# Patient Record
Sex: MALE | Race: ASIAN | URBAN - METROPOLITAN AREA
[De-identification: names, ages, dates, MRNs, and addresses within clinical notes are randomized per-mention and may not be internally consistent; named-entity substitution may affect disease eponyms.]

---

## 2017-05-15 ENCOUNTER — OFFICE VISIT (OUTPATIENT)
Dept: URGENT CARE | Facility: URGENT CARE | Age: 22
End: 2017-05-15
Payer: COMMERCIAL

## 2017-05-15 ENCOUNTER — RADIANT APPOINTMENT (OUTPATIENT)
Dept: GENERAL RADIOLOGY | Facility: CLINIC | Age: 22
End: 2017-05-15
Attending: FAMILY MEDICINE
Payer: COMMERCIAL

## 2017-05-15 VITALS
DIASTOLIC BLOOD PRESSURE: 70 MMHG | TEMPERATURE: 98.8 F | HEIGHT: 69 IN | WEIGHT: 200 LBS | HEART RATE: 104 BPM | SYSTOLIC BLOOD PRESSURE: 110 MMHG | BODY MASS INDEX: 29.62 KG/M2 | OXYGEN SATURATION: 97 %

## 2017-05-15 DIAGNOSIS — S93.401A SPRAIN OF RIGHT ANKLE, UNSPECIFIED LIGAMENT, INITIAL ENCOUNTER: Primary | ICD-10-CM

## 2017-05-15 DIAGNOSIS — S93.401A SPRAIN OF RIGHT ANKLE, UNSPECIFIED LIGAMENT, INITIAL ENCOUNTER: ICD-10-CM

## 2017-05-15 PROCEDURE — 99203 OFFICE O/P NEW LOW 30 MIN: CPT | Performed by: FAMILY MEDICINE

## 2017-05-15 PROCEDURE — 73610 X-RAY EXAM OF ANKLE: CPT | Mod: RT

## 2017-05-15 RX ORDER — HYDROCODONE BITARTRATE AND ACETAMINOPHEN 5; 325 MG/1; MG/1
1-2 TABLET ORAL EVERY 4 HOURS PRN
Qty: 15 TABLET | Refills: 0 | Status: SHIPPED | OUTPATIENT
Start: 2017-05-15

## 2017-05-15 NOTE — PATIENT INSTRUCTIONS
Treating Ankle Sprains  Treatment will depend on how bad your sprain is. For a severe sprain, healing may take 3 months or more.  Right after your injury: Use R.I.C.E.    Rest: At first, keep weight off the ankle as much as you can. You may be given crutches to help you walk without putting weight on the ankle.    Ice: Put an ice pack on the ankle for 15 minutes. Remove the pack and wait at least 30 minutes. Repeat for up to 3 days. This helps reduce swelling.    Compression: To reduce swelling and keep the joint stable, you may need to wrap the ankle with an elastic bandage. For more severe sprains, you may need an ankle brace or a cast.    Elevation: To reduce swelling, keep your ankle raised above your heart when you sit or lie down.  Medicine  Your healthcare provider may suggest oral non-steroidal anti-inflammatory medicine (NSAIDs), such as ibuprofen. This relieves the pain and helps reduce any swelling. Be sure to take your medicine as directed.  Contrast baths  After 3 days, soak your ankle in warm water for 30 seconds, then in cool water for 30 seconds. Go back and forth for 5 minutes. Doing this every 2 hours will help keep the swelling down.  Exercises    After about 2 to 3 weeks, you may be given exercises to strengthen the ligaments and muscles in the ankle. Doing these exercises will help prevent another ankle sprain. Exercises may include standing on your toes and then on your heels and doing ankle curls.  Ankle curls    Sit on the edge of a sturdy table or lie on your back.    Pull your toes toward you. Then point them away from you. Repeat for 2 to 3 minutes.     1066-2956 [employer]. [address] [place] [address]. All rights reserved. This information is not intended as a substitute for professional medical care. Always follow your healthcare professional's instructions.        What Are Ankle Sprains?  The ankle is one of the most common places in the body for a sprain.  Landing wrong on your foot can cause the ankle to roll to the side. This can stretch or tear ligaments. Ankle sprains can occur at any time, such as when you step off a curb or play sports. Once you ve had an ankle sprain, you may be more likely to sprain that ankle again.    When ligaments tear  Your ankle joint is where the bones in your leg and foot meet. Strong bands of tissue called ligaments connect these bones. Tendons cross the ankle and connect muscles in the lower leg to the foot. The ligaments and tendons help keep the ankle joint stable when you move. If you twist or turn your ankle, the ligaments can stretch or tear. This is called a sprain. A sprain can be mild, moderate, or severe. This depends on how badly the ligaments are damaged.    Symptoms  Your symptoms depend on how badly the ligaments are damaged. You may have little pain and swelling if the ligaments are only stretched. If the ligaments tear, you will have more pain and swelling. The more severe the sprain, the less you ll be able to move the ankle or put weight on it. The ankle may also turn black-and-blue, and the bruising may extend into the foot and leg.     9753-1805 [employer]. [address] [place] [address], [place] [address]. All rights reserved. This information is not intended as a substitute for professional medical care. Always follow your healthcare professional's instructions.

## 2017-05-15 NOTE — NURSING NOTE
"Jame Toledo is a 22 year old male.      Chief Complaint   Patient presents with     Urgent Care     Foot Pain     pt is here for R foot and ankle pain - he hurt it while running around about a week ago        Initial /70 (BP Location: Right arm, Cuff Size: Adult Large)  Pulse 104  Temp 98.8  F (37.1  C) (Oral)  Ht 5' 9\" (1.753 m)  Wt 200 lb (90.7 kg)  SpO2 97%  BMI 29.53 kg/m2 Estimated body mass index is 29.53 kg/(m^2) as calculated from the following:    Height as of this encounter: 5' 9\" (1.753 m).    Weight as of this encounter: 200 lb (90.7 kg).  Medication Reconciliation: complete      Questioned patient about current smoking habits.  Pt. has never smoked.      Fatoumata Arias CMA      "

## 2017-05-15 NOTE — MR AVS SNAPSHOT
After Visit Summary   5/15/2017    Jame Toledo    MRN: [id number]           Patient Information     Date Of Birth          1995        Visit Information        Provider Department      5/15/2017 5:05 PM Alicia Wright MD [facility] URGENT [facility]ARE        Today's Diagnoses     Sprain of right ankle, unspecified ligament, initial encounter    -  1      Care Instructions      Treating Ankle Sprains  Treatment will depend on how bad your sprain is. For a severe sprain, healing may take 3 months or more.  Right after your injury: Use R.I.C.E.    Rest: At first, keep weight off the ankle as much as you can. You may be given crutches to help you walk without putting weight on the ankle.    Ice: Put an ice pack on the ankle for 15 minutes. Remove the pack and wait at least 30 minutes. Repeat for up to 3 days. This helps reduce swelling.    Compression: To reduce swelling and keep the joint stable, you may need to wrap the ankle with an elastic bandage. For more severe sprains, you may need an ankle brace or a cast.    Elevation: To reduce swelling, keep your ankle raised above your heart when you sit or lie down.  Medicine  Your healthcare provider may suggest oral non-steroidal anti-inflammatory medicine (NSAIDs), such as ibuprofen. This relieves the pain and helps reduce any swelling. Be sure to take your medicine as directed.  Contrast baths  After 3 days, soak your ankle in warm water for 30 seconds, then in cool water for 30 seconds. Go back and forth for 5 minutes. Doing this every 2 hours will help keep the swelling down.  Exercises    After about 2 to 3 weeks, you may be given exercises to strengthen the ligaments and muscles in the ankle. Doing these exercises will help prevent another ankle sprain. Exercises may include standing on your toes and then on your heels and doing ankle curls.  Ankle curls    Sit on the edge of a sturdy table or lie on your back.    Pull your toes toward you.  Then point them away from you. Repeat for 2 to 3 minutes.     8066-4603 [employer]. [address] [place] [address]. All rights reserved. This information is not intended as a substitute for professional medical care. Always follow your healthcare professional's instructions.        What Are Ankle Sprains?  The ankle is one of the most common places in the body for a sprain. Landing wrong on your foot can cause the ankle to roll to the side. This can stretch or tear ligaments. Ankle sprains can occur at any time, such as when you step off a curb or play sports. Once you ve had an ankle sprain, you may be more likely to sprain that ankle again.    When ligaments tear  Your ankle joint is where the bones in your leg and foot meet. Strong bands of tissue called ligaments connect these bones. Tendons cross the ankle and connect muscles in the lower leg to the foot. The ligaments and tendons help keep the ankle joint stable when you move. If you twist or turn your ankle, the ligaments can stretch or tear. This is called a sprain. A sprain can be mild, moderate, or severe. This depends on how badly the ligaments are damaged.    Symptoms  Your symptoms depend on how badly the ligaments are damaged. You may have little pain and swelling if the ligaments are only stretched. If the ligaments tear, you will have more pain and swelling. The more severe the sprain, the less you ll be able to move the ankle or put weight on it. The ankle may also turn black-and-blue, and the bruising may extend into the foot and leg.     8098-8548 [employer]. [address] [place] [address]. All rights reserved. This information is not intended as a substitute for professional medical care. Always follow your healthcare professional's instructions.              Follow-ups after your visit        Who to contact     If you have questions or need follow up information about today's clinic visit or  "your schedule please contact [facility] URG[facility] directly at [phone].  Normal or non-critical lab and imaging results will be communicated to you by MyChart, letter or phone within 4 business days after the clinic has received the results. If you do not hear from us within 7 days, please contact the clinic through MyChart or phone. If you have a critical or abnormal lab result, we will notify you by phone as soon as possible.  Submit refill requests through [employer] or call your pharmacy and they will forward the refill request to us. Please allow 3 business days for your refill to be completed.          Additional Information About Your Visit        [employer] Information     [employer] lets you send messages to your doctor, view your test results, renew your prescriptions, schedule appointments and more. To sign up, go to [email or web address] . Click on \"Log in\" on the left side of the screen, which will take you to the Welcome page. Then click on \"Sign up Now\" on the right side of the page.     You will be asked to enter the access code listed below, as well as some personal information. Please follow the directions to create your username and password.     Your access code is: [email or web address]  Expires: 2017  5:47 PM     Your access code will  in 90 days. If you need help or a new code, please call your [facility] clinic or [phone].        Care EveryWhere ID     This is your Care EveryWhere ID. This could be used by other organizations to access your [facility] medical records  [id number]        Your Vitals Were     Pulse Temperature Height Pulse Oximetry BMI (Body Mass Index)       104 98.8  F (37.1  C) (Oral) 5' 9\" (1.753 m) 97% 29.53 kg/m2        Blood Pressure from Last 3 Encounters:   05/15/17 110/70    Weight from Last 3 Encounters:   05/15/17 200 lb (90.7 kg)                 Today's Medication Changes          These changes are accurate as of: 5/15/17  5:47 PM.  If you have any " questions, ask your nurse or doctor.               Start taking these medicines.        Dose/Directions    HYDROcodone-acetaminophen 5-325 MG per tablet   Commonly known as:  NORCO   Used for:  Sprain of right ankle, unspecified ligament, initial encounter   Started by:  Alicia Wright MD        Dose:  1-2 tablet   Take 1-2 tablets by mouth every 4 hours as needed for moderate to severe pain maximum *3** tablet(s) per day   Quantity:  15 tablet   Refills:  0       order for DME   Used for:  Sprain of right ankle, unspecified ligament, initial encounter   Started by:  Alicia Wright MD        Right ankle aircast splint   Quantity:  1 Units   Refills:  0            Where to get your medicines      Some of these will need a paper prescription and others can be bought over the counter.  Ask your nurse if you have questions.     Bring a paper prescription for each of these medications     HYDROcodone-acetaminophen 5-325 MG per tablet    order for DME                Primary Care Provider    None       No address on file        Thank you!     Thank you for choosing [facility]  for your care. Our goal is always to provide you with excellent care. Hearing back from our patients is one way we can continue to improve our services. Please take a few minutes to complete the written survey that you may receive in the mail after your visit with us. Thank you!             Your Updated Medication List - Protect others around you: Learn how to safely use, store and throw away your medicines at www.disposemymeds.org.          This list is accurate as of: 5/15/17  5:47 PM.  Always use your most recent med list.                   Brand Name Dispense Instructions for use    HYDROcodone-acetaminophen 5-325 MG per tablet    NORCO    15 tablet    Take 1-2 tablets by mouth every 4 hours as needed for moderate to severe pain maximum *3** tablet(s) per day       order for DME     1 Units    Right ankle aircast  splint

## 2017-05-16 NOTE — PROGRESS NOTES
"SUBJECTIVE:  Chief Complaint   Patient presents with     Urgent Care     Foot Pain     pt is here for R foot and ankle pain - he hurt it while running around about a week ago      Jame Toledo is a 22 year old male who presents today with right ankle pain that occurred 6 day(s) ago.    The mechanism of injury includes: twisted. Pain was gradual onset, still present and constant and moderate  Therapies to improve symptoms include: ibuprofen and ice  History of recurrent ankle injuries: no  Pain is not changed since onset.  Aggravating factors: walking and weight-bearing, Relieved byrest.    No past medical history on file.    ALLERGIES:  Review of patient's allergies indicates no known allergies.      No current outpatient prescriptions on file prior to visit.  No current facility-administered medications on file prior to visit.     Social History   Substance Use Topics     Smoking status: Not on file     Smokeless tobacco: Not on file     Alcohol use Not on file       No family history on file.      ROS:  INTEGUMENTARY/SKIN: NEGATIVE for worrisome rashes, moles or lesions  EYES: NEGATIVE for vision changes or irritation  ENT/MOUTH: NEGATIVE for ear, mouth and throat problems  GI: NEGATIVE for nausea, abdominal pain, heartburn, or change in bowel habits    OBJECTIVE:  /70 (BP Location: Right arm, Cuff Size: Adult Large)  Pulse 104  Temp 98.8  F (37.1  C) (Oral)  Ht 5' 9\" (1.753 m)  Wt 200 lb (90.7 kg)  SpO2 97%  BMI 29.53 kg/m2  EXAM: Patient appears alert and in moderate distress due to ankle pain    Ankle Exam: right    Inspection: swelling around the medial malleolus    Palpation: tender over medial malleolus    Both doralis pedis and posterior tibial pulses intact       Compression of the tibia and fibula distant to the ankle does not produce pain in the region of the ankle ( medial, lateral,  posterior, anterior)    Neuro:Normal strength and tone, sensory exam grossly normal  Vascular:  The foot is " warm with palpable pulses,  capillary refill 2 seconds in the toes  Skin:  No rashes, no sores, no open wounds    X-Ray: offical reading pending,normal mortise, no loose bodies, no fractures seen    ASSESSMENT  Sprain of right ankle, unspecified ligament, initial encounter      - XR Ankle Right G/E 3 Views; Future  - order for DME; Right ankle aircast splint  - HYDROcodone-acetaminophen (NORCO) 5-325 MG per tablet; Take 1-2 tablets by mouth every 4 hours as needed for moderate to severe pain maximum *3** tablet(s) per day     Ibuprofen and/ or tylenol for pain  Narcotic pain medication for more severe pain  Air cast splint for support --given in clinic  Ice to the ankle until swelling stops, then warm packs help improve blood flow the the area to heal the injured tissues.  Use a cane or crutches to help with ambulating until able to bear weight without extra support     Follow-up with primary care, orthopedics or podiatry if persistent pain and disability